# Patient Record
Sex: MALE | Race: WHITE | NOT HISPANIC OR LATINO | Employment: FULL TIME | URBAN - METROPOLITAN AREA
[De-identification: names, ages, dates, MRNs, and addresses within clinical notes are randomized per-mention and may not be internally consistent; named-entity substitution may affect disease eponyms.]

---

## 2024-01-22 ENCOUNTER — OFFICE VISIT (OUTPATIENT)
Dept: FAMILY MEDICINE CLINIC | Facility: CLINIC | Age: 34
End: 2024-01-22
Payer: COMMERCIAL

## 2024-01-22 VITALS
DIASTOLIC BLOOD PRESSURE: 84 MMHG | BODY MASS INDEX: 25.48 KG/M2 | SYSTOLIC BLOOD PRESSURE: 118 MMHG | RESPIRATION RATE: 16 BRPM | WEIGHT: 178 LBS | HEART RATE: 77 BPM | TEMPERATURE: 97.7 F | HEIGHT: 70 IN | OXYGEN SATURATION: 98 %

## 2024-01-22 DIAGNOSIS — Z76.89 ESTABLISHING CARE WITH NEW DOCTOR, ENCOUNTER FOR: Primary | ICD-10-CM

## 2024-01-22 DIAGNOSIS — Z90.49 S/P APPENDECTOMY: ICD-10-CM

## 2024-01-22 PROCEDURE — 99204 OFFICE O/P NEW MOD 45 MIN: CPT | Performed by: FAMILY MEDICINE

## 2024-01-22 RX ORDER — VITAMIN B COMPLEX
1 CAPSULE ORAL DAILY
COMMUNITY

## 2024-01-22 RX ORDER — ZINC GLUCONATE 50 MG
50 TABLET ORAL DAILY
COMMUNITY

## 2024-01-22 RX ORDER — UREA 10 %
500 LOTION (ML) TOPICAL 2 TIMES DAILY
COMMUNITY

## 2024-01-22 RX ORDER — MELATONIN
1000 DAILY
COMMUNITY

## 2024-01-22 RX ORDER — OMEGA-3-ACID ETHYL ESTERS 1 G/1
2 CAPSULE, LIQUID FILLED ORAL 2 TIMES DAILY
COMMUNITY

## 2024-01-22 NOTE — PROGRESS NOTES
Name: Jaime Person      : 1990      MRN: [id number]  Encounter Provider: Steve Ott MD  Encounter Date: 2024   Encounter department: [facility]    Assessment & Plan     1. Establishing care with new doctor, encounter for    2. S/P appendectomy           Subjective      PATIENT IS A NEW PATIENT TO THE PRACTICE    REVIEWED PAST MEDICAL HISTORY AND MEDICATIONS  NKA  FAMILY HX WAS UNREMARKABLE  - SMOKING  - ETOH 2-4 / WEEK    NO CONCERNS    DISCUSSED HEALTH AND PRACTICE ISSUES  RECOMMEND CPX / BW IN NEAR FUTURE      Review of Systems   Constitutional:  Negative for chills, fatigue and fever.   HENT:  Negative for congestion, ear discharge, ear pain, mouth sores, postnasal drip, sore throat and trouble swallowing.    Eyes:  Negative for pain, discharge and visual disturbance.   Respiratory:  Negative for cough, shortness of breath and wheezing.    Cardiovascular:  Negative for chest pain, palpitations and leg swelling.   Gastrointestinal:  Negative for abdominal distention, abdominal pain, blood in stool, diarrhea and nausea.   Endocrine: Negative for polydipsia, polyphagia and polyuria.   Genitourinary:  Negative for dysuria, frequency, hematuria and urgency.   Musculoskeletal:  Negative for arthralgias, gait problem and joint swelling.   Skin:  Negative for pallor and rash.   Neurological:  Negative for dizziness, syncope, speech difficulty, weakness, light-headedness, numbness and headaches.   Hematological:  Negative for adenopathy.   Psychiatric/Behavioral:  Negative for behavioral problems, confusion and sleep disturbance. The patient is not nervous/anxious.        Current Outpatient Medications on File Prior to Visit   Medication Sig   • b complex vitamins capsule Take 1 capsule by mouth daily   • cholecalciferol (VITAMIN D3) 1,000 units tablet Take 1,000 Units by mouth daily   • magnesium gluconate (MAGONATE) 500 mg tablet Take 500 mg by mouth 2 (two) times a day  "  • omega-3-acid ethyl esters (LOVAZA) 1 g capsule Take 2 g by mouth 2 (two) times a day   • Quercetin 50 MG TABS Take by mouth   • zinc gluconate 50 mg tablet Take 50 mg by mouth daily       Objective     /84 (BP Location: Left arm, Patient Position: Sitting, Cuff Size: Standard)   Pulse 77   Temp 97.7 °F (36.5 °C) (Temporal)   Resp 16   Ht 5' 9.5\" (1.765 m)   Wt 80.7 kg (178 lb)   SpO2 98%   BMI 25.91 kg/m²     Physical Exam  Vitals reviewed.   Constitutional:       General: He is not in acute distress.     Appearance: Normal appearance. He is well-developed and normal weight. He is not ill-appearing.   HENT:      Head: Normocephalic and atraumatic.   Eyes:      General:         Right eye: No discharge.         Left eye: No discharge.      Conjunctiva/sclera: Conjunctivae normal.      Pupils: Pupils are equal, round, and reactive to light.   Neck:      Thyroid: No thyromegaly.      Vascular: No JVD.   Cardiovascular:      Rate and Rhythm: Normal rate and regular rhythm.      Heart sounds: Normal heart sounds. No murmur heard.  Pulmonary:      Effort: Pulmonary effort is normal.      Breath sounds: Normal breath sounds. No wheezing or rales.   Abdominal:      General: Bowel sounds are normal.      Palpations: Abdomen is soft. There is no mass.      Tenderness: There is no abdominal tenderness. There is no guarding or rebound.   Musculoskeletal:         General: No tenderness or deformity. Normal range of motion.      Cervical back: Neck supple.   Lymphadenopathy:      Cervical: No cervical adenopathy.   Skin:     General: Skin is warm and dry.      Findings: No erythema or rash.   Neurological:      General: No focal deficit present.      Mental Status: He is alert and oriented to person, place, and time.   Psychiatric:         Mood and Affect: Mood normal.         Behavior: Behavior normal.         Thought Content: Thought content normal.         Judgment: Judgment normal.       Steve Ott, " MD

## 2024-02-21 ENCOUNTER — TELEPHONE (OUTPATIENT)
Age: 34
End: 2024-02-21

## 2024-02-21 DIAGNOSIS — M70.70 BURSITIS OF HIP, UNSPECIFIED BURSA, UNSPECIFIED LATERALITY: Primary | ICD-10-CM

## 2024-02-21 NOTE — TELEPHONE ENCOUNTER
Jaime is asking for an order to [facility] for L hip/ pelvic bursitis.  Please place order is his MyChart.    (Insurance on file does NOT need a insurance referral)

## 2024-02-21 NOTE — TELEPHONE ENCOUNTER
The patient called he would like a referral for an orthopedic doctor for his bursitis  thank you

## 2024-03-01 ENCOUNTER — APPOINTMENT (OUTPATIENT)
Dept: RADIOLOGY | Facility: CLINIC | Age: 34
End: 2024-03-01
Payer: COMMERCIAL

## 2024-03-01 VITALS
WEIGHT: 178 LBS | SYSTOLIC BLOOD PRESSURE: 128 MMHG | BODY MASS INDEX: 25.48 KG/M2 | HEART RATE: 76 BPM | HEIGHT: 70 IN | DIASTOLIC BLOOD PRESSURE: 83 MMHG

## 2024-03-01 DIAGNOSIS — G57.02 PIRIFORMIS SYNDROME OF LEFT SIDE: ICD-10-CM

## 2024-03-01 DIAGNOSIS — M25.552 PAIN IN LEFT HIP: ICD-10-CM

## 2024-03-01 DIAGNOSIS — M76.892 HAMSTRING TENDINITIS OF LEFT THIGH: Primary | ICD-10-CM

## 2024-03-01 PROCEDURE — 99204 OFFICE O/P NEW MOD 45 MIN: CPT | Performed by: ORTHOPAEDIC SURGERY

## 2024-03-01 PROCEDURE — 73502 X-RAY EXAM HIP UNI 2-3 VIEWS: CPT

## 2024-03-01 NOTE — PROGRESS NOTES
Assessment/Plan:  1. Hamstring tendinitis of left thigh  Ambulatory Referral to Orthopedic Surgery    Ambulatory referral to Physical Therapy      2. Piriformis syndrome of left side  XR hip/pelv 2-3 vws left if performed    Ambulatory referral to Physical Therapy          Jaime has left-sided buttock pain which appears to be at the insertion of his hamstring tendon.  This is consistent with hamstring tendinopathy.  He is not does not sound like he had an abrupt avulsion injury or evidence of tear however he may be experiencing some tendinosis of the hamstring.  He also sounds like he may be experiencing some occasional pain in the piriformis and buttock region that occurs after physical activity as well.  I recommended formal physical therapy at this time.  His x-rays today are unremarkable.  If his pain persist or worsens we could consider further imaging with MRI if clinically indicated.  He verbalized understand this plan.  Follow-up after therapy is complete.    Subjective:   Jaime Person is a 34 y.o. male who presents to the office for evaluation for left-sided buttock and leg pain.  He denies any specific injury or trauma.  He has been feeling increased discomfort in the left leg with physical activity such as playing soccer or running.  He typically plays soccer but has not been able to for the last 3 weeks.  He has a history of pain in the left buttock and went to another orthopedic office months ago and was told he had bursitis in the hip and was instructed to take anti-inflammatory medications.  This has not significantly helped.  He has not had any other formal evaluation or therapy.  He feels occasional pain into the left buttock area and increases after for a lot of physical activity.  Recently he tried lifting a fallen tree as a yard and this increased discomfort in the area of the buttock significantly.  He states is calm down since then but he still gets this occasional discomfort.      Review of  Systems   Constitutional:  Negative for chills, fever and unexpected weight change.   HENT:  Negative for hearing loss, nosebleeds and sore throat.    Eyes:  Negative for pain, redness and visual disturbance.   Respiratory:  Negative for cough, shortness of breath and wheezing.    Cardiovascular:  Negative for chest pain, palpitations and leg swelling.   Gastrointestinal:  Negative for abdominal pain, nausea and vomiting.   Endocrine: Negative for polyphagia and polyuria.   Genitourinary:  Negative for dysuria and hematuria.   Musculoskeletal:         See HPI   Skin:  Negative for rash and wound.   Neurological:  Negative for dizziness, numbness and headaches.   Psychiatric/Behavioral:  Negative for decreased concentration and suicidal ideas. The patient is not nervous/anxious.          History reviewed. No pertinent past medical history.    History reviewed. No pertinent surgical history.    History reviewed. No pertinent family history.    Social History     Occupational History    Not on file   Tobacco Use    Smoking status: Never    Smokeless tobacco: Never   Vaping Use    Vaping status: Never Used   Substance and Sexual Activity    Alcohol use: Yes     Alcohol/week: 2.0 - 4.0 standard drinks of alcohol     Types: 2 - 4 Standard drinks or equivalent per week     Comment: No drinking sunday to thursday    Drug use: Not Currently     Frequency: 1.0 times per week     Types: Marijuana     Comment: occasionally    Sexual activity: Yes     Partners: Female     Birth control/protection: None     Comment: . Wife is pregnant         Current Outpatient Medications:     b complex vitamins capsule, Take 1 capsule by mouth daily, Disp: , Rfl:     cholecalciferol (VITAMIN D3) 1,000 units tablet, Take 1,000 Units by mouth daily, Disp: , Rfl:     magnesium gluconate (MAGONATE) 500 mg tablet, Take 500 mg by mouth 2 (two) times a day, Disp: , Rfl:     omega-3-acid ethyl esters (LOVAZA) 1 g capsule, Take 2 g by mouth 2  (two) times a day, Disp: , Rfl:     Quercetin 50 MG TABS, Take by mouth, Disp: , Rfl:     zinc gluconate 50 mg tablet, Take 50 mg by mouth daily, Disp: , Rfl:     No Known Allergies    Objective:  Vitals:    03/01/24 1315   BP: 128/83   Pulse: 76            Left Hip Exam     Tenderness   The patient is experiencing tenderness in the ischial tuberosity.    Range of Motion   Abduction:  normal   Adduction:  normal   Extension:  normal   Flexion:  normal   External rotation:  normal   Internal rotation: normal     Muscle Strength   Abduction: 5/5   Adduction: 5/5   Flexion: 5/5     Tests   JERRY: negative    Other   Erythema: absent  Sensation: normal  Pulse: present            Physical Exam  Vitals reviewed.   Constitutional:       Appearance: He is well-developed.   HENT:      Head: Normocephalic and atraumatic.   Eyes:      Conjunctiva/sclera: Conjunctivae normal.      Pupils: Pupils are equal, round, and reactive to light.   Cardiovascular:      Rate and Rhythm: Normal rate.      Pulses: Normal pulses.   Pulmonary:      Effort: Pulmonary effort is normal. No respiratory distress.   Musculoskeletal:      Cervical back: Normal range of motion and neck supple.      Comments: As noted in HPI   Skin:     General: Skin is warm and dry.   Neurological:      General: No focal deficit present.      Mental Status: He is alert and oriented to person, place, and time.   Psychiatric:         Mood and Affect: Mood normal.         Behavior: Behavior normal.         I have personally reviewed pertinent films in PACS and my interpretation is as follows:  X-ray left hip demonstrates no evidence of acute fracture or significant degenerative      This document was created using speech voice recognition software.   Grammatical errors, random word insertions, pronoun errors, and incomplete sentences are an occasional consequence of this system due to software limitations, ambient noise, and hardware issues.   Any formal questions or  concerns about content, text, or information contained within the body of this dictation should be directly addressed to the provider for clarification.

## 2024-04-01 DIAGNOSIS — Z11.4 SCREENING FOR HIV (HUMAN IMMUNODEFICIENCY VIRUS): ICD-10-CM

## 2024-04-01 DIAGNOSIS — Z13.220 SCREENING FOR LIPID DISORDERS: ICD-10-CM

## 2024-04-01 DIAGNOSIS — Z00.00 ROUTINE GENERAL MEDICAL EXAMINATION AT A HEALTH CARE FACILITY: ICD-10-CM

## 2024-04-01 DIAGNOSIS — Z11.59 NEED FOR HEPATITIS C SCREENING TEST: ICD-10-CM

## 2024-04-01 DIAGNOSIS — Z13.29 SCREENING FOR THYROID DISORDER: ICD-10-CM

## 2024-04-01 DIAGNOSIS — Z13.6 SCREENING FOR HYPERTENSION: Primary | ICD-10-CM

## 2024-05-28 LAB
BASOPHILS # BLD AUTO: 0 X10E3/UL (ref 0–0.2)
BASOPHILS NFR BLD AUTO: 1 %
EOSINOPHIL # BLD AUTO: 0.2 X10E3/UL (ref 0–0.4)
EOSINOPHIL NFR BLD AUTO: 2 %
ERYTHROCYTE [DISTWIDTH] IN BLOOD BY AUTOMATED COUNT: 12.5 % (ref 11.6–15.4)
HCT VFR BLD AUTO: 42.8 % (ref 37.5–51)
HGB BLD-MCNC: 14.7 G/DL (ref 13–17.7)
IMM GRANULOCYTES # BLD: 0 X10E3/UL (ref 0–0.1)
IMM GRANULOCYTES NFR BLD: 0 %
LYMPHOCYTES # BLD AUTO: 1.5 X10E3/UL (ref 0.7–3.1)
LYMPHOCYTES NFR BLD AUTO: 22 %
MCH RBC QN AUTO: 29.7 PG (ref 26.6–33)
MCHC RBC AUTO-ENTMCNC: 34.3 G/DL (ref 31.5–35.7)
MCV RBC AUTO: 87 FL (ref 79–97)
MONOCYTES # BLD AUTO: 0.5 X10E3/UL (ref 0.1–0.9)
MONOCYTES NFR BLD AUTO: 8 %
NEUTROPHILS # BLD AUTO: 4.6 X10E3/UL (ref 1.4–7)
NEUTROPHILS NFR BLD AUTO: 67 %
PLATELET # BLD AUTO: 267 X10E3/UL (ref 150–450)
RBC # BLD AUTO: 4.95 X10E6/UL (ref 4.14–5.8)
WBC # BLD AUTO: 6.9 X10E3/UL (ref 3.4–10.8)

## 2024-05-29 LAB
ALBUMIN SERPL-MCNC: 5.1 G/DL (ref 4.1–5.1)
ALBUMIN/GLOB SERPL: 2.3 {RATIO} (ref 1.2–2.2)
ALP SERPL-CCNC: 66 IU/L (ref 44–121)
ALT SERPL-CCNC: 16 IU/L (ref 0–44)
AST SERPL-CCNC: 26 IU/L (ref 0–40)
BILIRUB SERPL-MCNC: 0.8 MG/DL (ref 0–1.2)
BUN SERPL-MCNC: 15 MG/DL (ref 6–20)
BUN/CREAT SERPL: 19 (ref 9–20)
CALCIUM SERPL-MCNC: 10 MG/DL (ref 8.7–10.2)
CHLORIDE SERPL-SCNC: 100 MMOL/L (ref 96–106)
CHOLEST SERPL-MCNC: 243 MG/DL (ref 100–199)
CHOLEST/HDLC SERPL: 3.6 RATIO (ref 0–5)
CO2 SERPL-SCNC: 26 MMOL/L (ref 20–29)
CREAT SERPL-MCNC: 0.79 MG/DL (ref 0.76–1.27)
EGFR: 120 ML/MIN/1.73
GLOBULIN SER-MCNC: 2.2 G/DL (ref 1.5–4.5)
GLUCOSE SERPL-MCNC: 78 MG/DL (ref 70–99)
HCV AB S/CO SERPL IA: NON REACTIVE
HDLC SERPL-MCNC: 68 MG/DL
HIV 1+2 AB+HIV1 P24 AG SERPL QL IA: NON REACTIVE
LDLC SERPL CALC-MCNC: 165 MG/DL (ref 0–99)
POTASSIUM SERPL-SCNC: 4.3 MMOL/L (ref 3.5–5.2)
PROT SERPL-MCNC: 7.3 G/DL (ref 6–8.5)
SL AMB VLDL CHOLESTEROL CALC: 10 MG/DL (ref 5–40)
SODIUM SERPL-SCNC: 140 MMOL/L (ref 134–144)
TRIGL SERPL-MCNC: 63 MG/DL (ref 0–149)
TSH SERPL DL<=0.005 MIU/L-ACNC: 1.33 UIU/ML (ref 0.45–4.5)

## 2024-06-02 NOTE — PATIENT INSTRUCTIONS
DISCUSSED HEALTH MAINTENENCE ISSUES  BW WILL BE REVIEWED  ENCOURAGED HEALTHY DIET AND EXERCISE  COLONOSCOPY WILL BE ORDERED  RV FOR ANNUAL HEALTH EXAM IN 1 YEAR  RV SOONER IF THERE ARE ANY CONCERNS      Recent Results (from the past 672 hour(s))   CBC and differential    Collection Time: 05/28/24 12:55 PM   Result Value Ref Range    White Blood Cell Count 6.9 3.4 - 10.8 x10E3/uL    Red Blood Cell Count 4.95 4.14 - 5.80 x10E6/uL    Hemoglobin 14.7 13.0 - 17.7 g/dL    HCT 42.8 37.5 - 51.0 %    MCV 87 79 - 97 fL    MCH 29.7 26.6 - 33.0 pg    MCHC 34.3 31.5 - 35.7 g/dL    RDW 12.5 11.6 - 15.4 %    Platelet Count 267 150 - 450 x10E3/uL    Neutrophils 67 Not Estab. %    Lymphocytes 22 Not Estab. %    Monocytes 8 Not Estab. %    Eosinophils 2 Not Estab. %    Basophils PCT 1 Not Estab. %    Neutrophils (Absolute) 4.6 1.4 - 7.0 x10E3/uL    Lymphocytes (Absolute) 1.5 0.7 - 3.1 x10E3/uL    Monocytes (Absolute) 0.5 0.1 - 0.9 x10E3/uL    Eosinophils (Absolute) 0.2 0.0 - 0.4 x10E3/uL    Basophils ABS 0.0 0.0 - 0.2 x10E3/uL    Immature Granulocytes 0 Not Estab. %    Immature Granulocytes (Absolute) 0.0 0.0 - 0.1 x10E3/uL   Comprehensive metabolic panel    Collection Time: 05/28/24 12:55 PM   Result Value Ref Range    Glucose, Random 78 70 - 99 mg/dL    BUN 15 6 - 20 mg/dL    Creatinine 0.79 0.76 - 1.27 mg/dL    eGFR 120 >59 mL/min/1.73    SL AMB BUN/CREATININE RATIO 19 9 - 20    Sodium 140 134 - 144 mmol/L    Potassium 4.3 3.5 - 5.2 mmol/L    Chloride 100 96 - 106 mmol/L    CO2 26 20 - 29 mmol/L    CALCIUM 10.0 8.7 - 10.2 mg/dL    Protein, Total 7.3 6.0 - 8.5 g/dL    Albumin 5.1 4.1 - 5.1 g/dL    Globulin, Total 2.2 1.5 - 4.5 g/dL    Albumin/Globulin Ratio 2.3 (H) 1.2 - 2.2    TOTAL BILIRUBIN 0.8 0.0 - 1.2 mg/dL    Alk Phos Isoenzymes 66 44 - 121 IU/L    AST 26 0 - 40 IU/L    ALT 16 0 - 44 IU/L   Human Immunodeficiency Virus 1/2 Antigen / Antibody ( Fourth Generation) with Reflex Testing    Collection Time: 05/28/24 12:55 PM    Result Value Ref Range    HIV Screen 4th Generation wRflx Non Reactive Non Reactive   Lipid panel    Collection Time: 05/28/24 12:55 PM   Result Value Ref Range    Cholesterol, Total 243 (H) 100 - 199 mg/dL    Triglycerides 63 0 - 149 mg/dL    HDL 68 >39 mg/dL    VLDL Cholesterol Calculated 10 5 - 40 mg/dL    LDL Calculated 165 (H) 0 - 99 mg/dL    T. Chol/HDL Ratio 3.6 0.0 - 5.0 ratio   TSH, 3rd generation    Collection Time: 05/28/24 12:55 PM   Result Value Ref Range    TSH 1.330 0.450 - 4.500 uIU/mL   Hepatitis C antibody    Collection Time: 05/28/24 12:55 PM   Result Value Ref Range    HEP C AB Non Reactive Non Reactive

## 2024-06-03 ENCOUNTER — OFFICE VISIT (OUTPATIENT)
Dept: FAMILY MEDICINE CLINIC | Facility: CLINIC | Age: 34
End: 2024-06-03
Payer: COMMERCIAL

## 2024-06-03 VITALS
HEART RATE: 66 BPM | BODY MASS INDEX: 26.51 KG/M2 | HEIGHT: 70 IN | RESPIRATION RATE: 16 BRPM | WEIGHT: 185.2 LBS | SYSTOLIC BLOOD PRESSURE: 134 MMHG | OXYGEN SATURATION: 97 % | DIASTOLIC BLOOD PRESSURE: 80 MMHG | TEMPERATURE: 98 F

## 2024-06-03 DIAGNOSIS — Z13.6 SCREENING FOR HYPERTENSION: ICD-10-CM

## 2024-06-03 DIAGNOSIS — Z13.220 SCREENING FOR HYPERLIPIDEMIA: ICD-10-CM

## 2024-06-03 DIAGNOSIS — Z13.29 SCREENING FOR HYPOTHYROIDISM: ICD-10-CM

## 2024-06-03 DIAGNOSIS — Z00.00 ROUTINE GENERAL MEDICAL EXAMINATION AT A HEALTH CARE FACILITY: Primary | ICD-10-CM

## 2024-06-03 PROCEDURE — 93000 ELECTROCARDIOGRAM COMPLETE: CPT | Performed by: FAMILY MEDICINE

## 2024-06-03 PROCEDURE — 99395 PREV VISIT EST AGE 18-39: CPT | Performed by: FAMILY MEDICINE

## 2024-06-03 RX ORDER — FLUTICASONE PROPIONATE 50 MCG
1 SPRAY, SUSPENSION (ML) NASAL DAILY PRN
COMMUNITY

## 2024-06-03 NOTE — PROGRESS NOTES
FAMILY PRACTICE HEALTH MAINTENANCE OFFICE VISIT  [facility] - [facility]    NAME: Jaime Person  AGE: 34 y.o. SEX: male  : 1990     DATE: 6/3/2024    Assessment and Plan     Problem List Items Addressed This Visit    None  Visit Diagnoses     Routine general medical examination at a health care facility    -  Primary    Screening for hypertension        Relevant Orders    POCT ECG    Screening for hyperlipidemia        Screening for hypothyroidism                   Return in about 1 year (around 6/3/2025) for Annual physical.        Chief Complaint     Chief Complaint   Patient presents with   • Physical Exam     Shayan/VALERIE         History of Present Illness     HPI    Well Adult Physical   Patient here for a comprehensive physical exam.      Diet and Physical Activity  Diet: well balanced diet  Weight concerns: Patient is overweight (BMI 25.0-29.9)  Exercise: intermittently      Depression Screen  PHQ-2/9 Depression Screening    Little interest or pleasure in doing things: 0 - not at all  Feeling down, depressed, or hopeless: 0 - not at all          General Health  Hearing: Normal:  bilateral  Vision: no vision problems  Dental: regular dental visits    Reproductive Health          The following portions of the patient's history were reviewed and updated as appropriate: allergies, current medications, past family history, past medical history, past social history, past surgical history and problem list.    Review of Systems     Review of Systems    Past Medical History     History reviewed. No pertinent past medical history.    Past Surgical History     History reviewed. No pertinent surgical history.    Social History     Social History     Socioeconomic History   • Marital status: /Civil Union     Spouse name: None   • Number of children: None   • Years of education: None   • Highest education level: None   Occupational History   • None   Tobacco Use   • Smoking  status: Never   • Smokeless tobacco: Never   Vaping Use   • Vaping status: Never Used   Substance and Sexual Activity   • Alcohol use: Yes     Alcohol/week: 2.0 - 4.0 standard drinks of alcohol     Types: 2 - 4 Standard drinks or equivalent per week     Comment: No drinking sunday to thursday   • Drug use: Not Currently     Frequency: 1.0 times per week     Types: Marijuana     Comment: occasionally   • Sexual activity: Yes     Partners: Female     Birth control/protection: None     Comment: . Wife is pregnant   Other Topics Concern   • None   Social History Narrative   • None     Social Determinants of Health     Financial Resource Strain: Not on file   Food Insecurity: Not on file   Transportation Needs: Not on file   Physical Activity: Not on file   Stress: Not on file   Social Connections: Not on file   Intimate Partner Violence: Not on file   Housing Stability: Not on file       Family History     History reviewed. No pertinent family history.    Current Medications       Current Outpatient Medications:   •  b complex vitamins capsule, Take 1 capsule by mouth daily, Disp: , Rfl:   •  cholecalciferol (VITAMIN D3) 1,000 units tablet, Take 1,000 Units by mouth daily, Disp: , Rfl:   •  fluticasone (FLONASE) 50 mcg/act nasal spray, 1 spray into each nostril daily as needed, Disp: , Rfl:   •  magnesium gluconate (MAGONATE) 500 mg tablet, Take 500 mg by mouth 2 (two) times a day, Disp: , Rfl:   •  omega-3-acid ethyl esters (LOVAZA) 1 g capsule, Take 2 g by mouth 2 (two) times a day, Disp: , Rfl:   •  Quercetin 50 MG TABS, Take by mouth, Disp: , Rfl:   •  zinc gluconate 50 mg tablet, Take 50 mg by mouth daily, Disp: , Rfl:      Allergies     Allergies   Allergen Reactions   • Pollen Extract Nasal Congestion and Sneezing       Objective     There were no vitals taken for this visit.     Physical Exam      No results found.    Health Maintenance     Health Maintenance   Topic Date Due   • DTaP,Tdap,and Td Vaccines  (1 - Tdap) Never done   • COVID-19 Vaccine (1 - 2023-24 season) Never done   • Influenza Vaccine (Season Ended) 09/01/2024   • Depression Screening  01/22/2025   • Annual Physical  06/03/2025   • Zoster Vaccine (1 of 2) 02/27/2041   • RSV Vaccine Age 60+ Years (1 - 1-dose 60+ series) 02/27/2051   • HIV Screening  Completed   • Hepatitis C Screening  Completed   • RSV Vaccine age 0-20 Months  Aged Out   • Pneumococcal Vaccine: Pediatrics (0 to 5 Years) and At-Risk Patients (6 to 64 Years)  Aged Out   • HIB Vaccine  Aged Out   • IPV Vaccine  Aged Out   • Hepatitis A Vaccine  Aged Out   • Meningococcal ACWY Vaccine  Aged Out   • HPV Vaccine  Aged Out       There is no immunization history on file for this patient.    Steve Ott MD  [facility]

## 2024-06-03 NOTE — LETTER
ABDIAZIZ COHEN      Current Outpatient Medications:     b complex vitamins capsule, Take 1 capsule by mouth daily, Disp: , Rfl:     cholecalciferol (VITAMIN D3) 1,000 units tablet, Take 1,000 Units by mouth daily, Disp: , Rfl:     magnesium gluconate (MAGONATE) 500 mg tablet, Take 500 mg by mouth 2 (two) times a day, Disp: , Rfl:     omega-3-acid ethyl esters (LOVAZA) 1 g capsule, Take 2 g by mouth 2 (two) times a day, Disp: , Rfl:     Quercetin 50 MG TABS, Take by mouth, Disp: , Rfl:     zinc gluconate 50 mg tablet, Take 50 mg by mouth daily, Disp: , Rfl:     Recent Results (from the past 672 hour(s))   CBC and differential    Collection Time: 05/28/24 12:55 PM   Result Value Ref Range    White Blood Cell Count 6.9 3.4 - 10.8 x10E3/uL    Red Blood Cell Count 4.95 4.14 - 5.80 x10E6/uL    Hemoglobin 14.7 13.0 - 17.7 g/dL    HCT 42.8 37.5 - 51.0 %    MCV 87 79 - 97 fL    MCH 29.7 26.6 - 33.0 pg    MCHC 34.3 31.5 - 35.7 g/dL    RDW 12.5 11.6 - 15.4 %    Platelet Count 267 150 - 450 x10E3/uL    Neutrophils 67 Not Estab. %    Lymphocytes 22 Not Estab. %    Monocytes 8 Not Estab. %    Eosinophils 2 Not Estab. %    Basophils PCT 1 Not Estab. %    Neutrophils (Absolute) 4.6 1.4 - 7.0 x10E3/uL    Lymphocytes (Absolute) 1.5 0.7 - 3.1 x10E3/uL    Monocytes (Absolute) 0.5 0.1 - 0.9 x10E3/uL    Eosinophils (Absolute) 0.2 0.0 - 0.4 x10E3/uL    Basophils ABS 0.0 0.0 - 0.2 x10E3/uL    Immature Granulocytes 0 Not Estab. %    Immature Granulocytes (Absolute) 0.0 0.0 - 0.1 x10E3/uL   Comprehensive metabolic panel    Collection Time: 05/28/24 12:55 PM   Result Value Ref Range    Glucose, Random 78 70 - 99 mg/dL    BUN 15 6 - 20 mg/dL    Creatinine 0.79 0.76 - 1.27 mg/dL    eGFR 120 >59 mL/min/1.73    SL AMB BUN/CREATININE RATIO 19 9 - 20    Sodium 140 134 - 144 mmol/L    Potassium 4.3 3.5 - 5.2 mmol/L    Chloride 100 96 - 106 mmol/L    CO2 26 20 - 29 mmol/L    CALCIUM 10.0 8.7 - 10.2 mg/dL    Protein, Total 7.3 6.0 - 8.5 g/dL    Albumin  5.1 4.1 - 5.1 g/dL    Globulin, Total 2.2 1.5 - 4.5 g/dL    Albumin/Globulin Ratio 2.3 (H) 1.2 - 2.2    TOTAL BILIRUBIN 0.8 0.0 - 1.2 mg/dL    Alk Phos Isoenzymes 66 44 - 121 IU/L    AST 26 0 - 40 IU/L    ALT 16 0 - 44 IU/L   Human Immunodeficiency Virus 1/2 Antigen / Antibody ( Fourth Generation) with Reflex Testing    Collection Time: 05/28/24 12:55 PM   Result Value Ref Range    HIV Screen 4th Generation wRflx Non Reactive Non Reactive   Lipid panel    Collection Time: 05/28/24 12:55 PM   Result Value Ref Range    Cholesterol, Total 243 (H) 100 - 199 mg/dL    Triglycerides 63 0 - 149 mg/dL    HDL 68 >39 mg/dL    VLDL Cholesterol Calculated 10 5 - 40 mg/dL    LDL Calculated 165 (H) 0 - 99 mg/dL    T. Chol/HDL Ratio 3.6 0.0 - 5.0 ratio   TSH, 3rd generation    Collection Time: 05/28/24 12:55 PM   Result Value Ref Range    TSH 1.330 0.450 - 4.500 uIU/mL   Hepatitis C antibody    Collection Time: 05/28/24 12:55 PM   Result Value Ref Range    HEP C AB Non Reactive Non Reactive

## 2024-06-07 ENCOUNTER — TELEPHONE (OUTPATIENT)
Age: 34
End: 2024-06-07

## 2024-06-07 NOTE — TELEPHONE ENCOUNTER
The patient called to inform that he does not need the TB vaccine because he had it in 2020 and his previous doctor will send the immunization records.

## 2024-06-17 ENCOUNTER — EVALUATION (OUTPATIENT)
Dept: PHYSICAL THERAPY | Facility: CLINIC | Age: 34
End: 2024-06-17
Payer: COMMERCIAL

## 2024-06-17 DIAGNOSIS — G57.02 PIRIFORMIS SYNDROME OF LEFT SIDE: ICD-10-CM

## 2024-06-17 DIAGNOSIS — M76.892 HAMSTRING TENDINITIS OF LEFT THIGH: ICD-10-CM

## 2024-06-17 PROCEDURE — 97162 PT EVAL MOD COMPLEX 30 MIN: CPT

## 2024-06-17 NOTE — PROGRESS NOTES
PT Evaluation     Today's date: 2024  Patient name: Jaime Person  : 1990  MRN: [id number]  Referring provider: Ricardo Soto DO  Dx:   Encounter Diagnosis     ICD-10-CM    1. Hamstring tendinitis of left thigh  M76.892 Ambulatory referral to Physical Therapy      2. Piriformis syndrome of left side  G57.02 Ambulatory referral to Physical Therapy          Start Time: 1648  Stop Time: 1738  Total time in clinic (min): 50 minutes    Assessment  Impairments: abnormal or restricted ROM, activity intolerance, impaired physical strength, lacks appropriate home exercise program and pain with function    Assessment details: Jaime Person is a 34 y.o. male presenting with left posterior hip/gluteal pain that began about 3 months ago while playing soccer. Pt was gradually improving independently however recently experienced a significant increase in pain after lifting heavy objects around his home. Pt currently presents with tenderness to palpation over left piriformis/deep gluteal muscles and proximal hamstring tendon, mildly impaired left hip ROM, decreased left LE strength, pain with functional movements that involve deep hip flexion, tightness of gluteals/hip flexors/hamstrings, increased anterior pelvic tilt, mild atrophy of left gastroc/soleus, and mildly painful resisted hamstring isometric. Pt functional impairments currently include transitioning out of bed, walking, soccer/sport activity, running, lifting, and sleeping due to pain. Pt clinical presentation is consistent with referring diagnosis of left hamstring tendinitis and left piriformis syndrome likely secondary to muscular imbalances and repetitive loading. Jaime would benefit from skilled physical therapy services to address aforementioned impairments, reduce risk of injury recurrence with gradual tendon loading, and promote return to sport and prior level of function without limitation.   Understanding of Dx/Px/POC: good     Prognosis:  good    Goals  STG 2-4 weeks  1. Patient will be independent with HEP  2. Patient will improve left hip extension strength by 1/2 MMT grade and pain <1/10   3. Patient will improve left knee flexion strength by 1/2 MMT grade and pain <1/10   4. Patient will demonstrate improved left hip ROM to WNL    LTG 6-8 weeks   1. Patient will be able to return to soccer without limitation   2. Patient will be independent with dynamic warmup and cool down routine to reduce risk of injury with return to sport   3. Patient will be able to lift at least 25lbs with pain <2/10   4. Patient will be able to transfer out of bed with pain <1/10   5. Patient will be able to sleep 6-8 hours without waking due to pain    Plan  Patient would benefit from: skilled physical therapy and PT eval  Planned modality interventions: cryotherapy, electrical stimulation/Russian stimulation and thermotherapy: hydrocollator packs    Planned therapy interventions: joint mobilization, kinesiology taping, manual therapy, neuromuscular re-education, patient/caregiver education, stretching, strengthening, therapeutic activities, therapeutic exercise, functional ROM exercises, home exercise program and postural training    Frequency: 2x week  Duration in weeks: 8  Plan of Care beginning date: 6/17/2024  Plan of Care expiration date: 8/12/2024  Treatment plan discussed with: patient  Plan details: HEP development, stretching, strengthening, A/AA/PROM, joint mobilizations, posture education, STM/MI as needed to reduce muscle tension, muscle reeducation, PLOC discussed and agreed upon with patient.          Subjective Evaluation    History of Present Illness  Mechanism of injury: Jaime reports experiencing pain in back of left hip that began about 3 months ago while playing soccer. Pt reports pain intermittently became sharp and radiated down his left leg to knee, but is more typically a dull ache in glute. Pt states his pain was subsiding with stretching and  rest until about 2 weeks ago after lifting heavy objects around his home to prepare for  baby. Pt reports it has been difficult to sleep, lift his leg to get out of bed, walk, and play soccer due to pain and stiffness. Pt states he typically will take ibuprofen prior to soccer which seems to help manage his pain, and has integrated an active warmup since his initial injury. Additionally, pt reports history of left ankle fracture about 20 years ago that he did not have formal physical therapy however he notices his left lower leg has less muscle mass.  Patient Goals  Patient goals for therapy: decreased pain, return to sport/leisure activities and increased motion  Patient goal: return to soccer without pain  Pain  Current pain ratin  At best pain ratin  At worst pain ratin  Quality: dull ache and sharp  Relieving factors: rest and medications  Aggravating factors: running and lifting    Social Support  Steps to enter house: yes  Stairs in house: no   Lives in: one-story house  Lives with: spouse    Employment status: working ()        Objective     Static Posture     Pelvis   Anterior pelvic tilt    Observations   Left Ankle/Foot   Positive for atrophy.     Additional Observation Details  Left gastroc/soleus reduced in size compared to right; hx of left ankle fracture without formal rehabilitation     Palpation   Left   No palpable tenderness to the lumbar paraspinals and quadratus lumborum.   Hypertonic in the piriformis.   Tenderness of the gluteus albert, gluteus medius, piriformis, proximal biceps femoris, proximal semimembranosus and proximal semitendinosus.   Trigger point to piriformis.     Right   No palpable tenderness to the gluteus albert, gluteus medius, lumbar paraspinals, piriformis, proximal biceps femoris, proximal semimembranosus, proximal semitendinosus and quadratus lumborum.     Tenderness     Left Hip   Tenderness in the ischial tuberosity. No tenderness in  the PSIS and sacroiliac joint.     Right Hip   No tenderness in the PSIS, ischial tuberosity and sacroiliac joint.     Additional Tenderness Details  Left proximal hamstring tendon     Lumbar Screen  Lumbar range of motion within normal limits.    Tests     Left Hip   Positive Ely's and JRERY.   Negative FADIR.   SLR: Positive.     Right Hip   Positive Ely's.   Negative JERRY and FADIR.   SLR: Negative.     Ambulation     Ambulation: Stairs   Ascend stairs: independent  Pattern: reciprocal  Railings: without rails  Descend stairs: independent  Pattern: reciprocal  Railings: without rails    Additional Stairs Ambulation Details  No pain with stairs     Observational Gait   Gait: within functional limits   Left stance time, right stance time, left swing time, right swing time and left step length within functional limits.     Quality of Movement During Gait     Pelvis  Anterior pelvic tilt.     Hip  Left hip within functional limits and right hip within functional limits.     Functional Assessment      Squat    Pain.     Single Leg Squat   Left Leg  Valgus.     Right Leg  Within functional limits.     Comments  Squat: pain on left when performed at full depth; reduced pain with decreased depth         MMT IE IE      Left Right Left Right   Hip Flex 4+ 4+     Hip Abd 4+ 5     Hip IR 4+ 4+     Hip ER 4 4+     Hip Ext 4+ * 5     Knee Ext 5 5     Knee Flex 4+ * 5       ROM IE IE      Left Right Left Right   Hip Flex 112A/117P 115A/120P     Hip IR 12P supine @ 90 15P supine @90     Hip ER 32P supine @90 40P supine @90            (*) denotes discomfort with testing            Precautions: none        Insurance:  AMA/CMS Eval/ Re-eval Auth #/ Referral # Total units or visits Start date  Expiration date KX? Visit limitation?  PT only or  PT+OT? Co-Insurance   AMA 6/17/24 Not required     BOMN  20% after ded                  POC Start Date POC Expiration Date Signed POC?   6/17/2024 8/12/2024 pending      Date                Visits/Units:  Used               Authed: not required  Remaining                      Manuals    6/17                               Neuro Re-Ed       Bridges    Up with 2, down with L 5sec lower 10x   Posterior pelvic tilts    In standing 5sec hold 5x                                      Ther Ex       HEP    Initiated    Education    Pelvic positioning at work, loading principles    Hip extension    Prone 10x 5sec lower   Piriformis stretch    1 x 30s   Cross over glute stretch    1 x 30s                                      Ther Activity                     Gait Training                     Modalities

## 2024-06-20 ENCOUNTER — OFFICE VISIT (OUTPATIENT)
Dept: PHYSICAL THERAPY | Facility: CLINIC | Age: 34
End: 2024-06-20
Payer: COMMERCIAL

## 2024-06-20 DIAGNOSIS — M76.892 HAMSTRING TENDINITIS OF LEFT THIGH: Primary | ICD-10-CM

## 2024-06-20 DIAGNOSIS — G57.02 PIRIFORMIS SYNDROME OF LEFT SIDE: ICD-10-CM

## 2024-06-20 PROCEDURE — 97112 NEUROMUSCULAR REEDUCATION: CPT

## 2024-06-20 PROCEDURE — 97110 THERAPEUTIC EXERCISES: CPT

## 2024-06-20 NOTE — PROGRESS NOTES
Daily Note     Today's date: 2024  Patient name: Jaime Person  : 1990  MRN: [id number]  Referring provider: Ricardo Soto DO  Dx:   Encounter Diagnosis     ICD-10-CM    1. Hamstring tendinitis of left thigh  M76.892       2. Piriformis syndrome of left side  G57.02           Start Time: 1021  Stop Time: 1101  Total time in clinic (min): 40 minutes    Subjective: Pt states that his hip and hamstring have felt good since his eval, he just had a baby so he wasn't able to get to his HEP consistently but did do them yesterday. Pt felt general workout soreness and no pain in his hamstrings with his movements. No new complaints.       Objective: See treatment diary below      Assessment: Tolerated treatment well. Patient demonstrated fatigue post treatment, exhibited good technique with therapeutic exercises, and would benefit from continued PT. Progressed treatments today for more hamstring and glute strengthening, given minor VC for proper hip positioning. No pain noted throughout the session.       Plan: Continue per plan of care.  Progress treatment as tolerated.       Precautions: none        Insurance:  AMA/CMS Eval/ Re-eval Auth #/ Referral # Total units or visits Start date  Expiration date KX? Visit limitation?  PT only or  PT+OT? Co-Insurance   AMA 24 Not required     BOMN  20% after ded                  POC Start Date POC Expiration Date Signed POC?   2024 pending      Date               Visits/Units:  Used               Authed: not required  Remaining                      Manuals                                  Neuro Re-Ed       Bridges   Up with 2, down with L 2x10    W/GPB HS curl 1x15 Up with 2, down with L 5sec lower 10x   Posterior pelvic tilts    In standing 5sec hold 5x   Single leg RDL   2x10 BLE    Lunge sliders   2 ways 10x ea BLE                         Ther Ex       HEP    Initiated    Education    Pelvic positioning at work, loading principles    Hip  extension   Prone 2x10 Prone 10x 5sec lower   Piriformis stretch   2x30s B 1 x 30s   Cross over glute stretch   2x30s B 1 x 30s   Donkey kicks   10x BLE                                Ther Activity                     Gait Training                     Modalities

## 2024-06-24 ENCOUNTER — OFFICE VISIT (OUTPATIENT)
Dept: PHYSICAL THERAPY | Facility: CLINIC | Age: 34
End: 2024-06-24
Payer: COMMERCIAL

## 2024-06-24 DIAGNOSIS — M76.892 HAMSTRING TENDINITIS OF LEFT THIGH: Primary | ICD-10-CM

## 2024-06-24 DIAGNOSIS — G57.02 PIRIFORMIS SYNDROME OF LEFT SIDE: ICD-10-CM

## 2024-06-24 PROCEDURE — 97110 THERAPEUTIC EXERCISES: CPT

## 2024-06-24 PROCEDURE — 97112 NEUROMUSCULAR REEDUCATION: CPT

## 2024-06-24 NOTE — PROGRESS NOTES
Daily Note     Today's date: 2024  Patient name: Jaime Person  : 1990  MRN: [id number]  Referring provider: Ricardo Soto DO  Dx:   Encounter Diagnosis     ICD-10-CM    1. Hamstring tendinitis of left thigh  M76.892       2. Piriformis syndrome of left side  G57.02           Start Time: 1532  Stop Time: 1615  Total time in clinic (min): 43 minutes    Subjective: Pt reports that his LLE was more sore than he expected after his last treatment session, however he was feeling good. Pt wasn't able to get to the full HEP over the weekend but was still stretching and doing a few of his exercises. Pt states some tenderness in his L hamstrings prior to the start of his treatment session today.       Objective: See treatment diary below      Assessment: Tolerated treatment well. Patient demonstrated fatigue post treatment, exhibited good technique with therapeutic exercises, and would benefit from continued PT. Continued to work on progressions of his strengthening program with minimal VC needed for hip positioning. Pt demonstrated good carryover from his previous session.       Plan: Continue per plan of care.  Progress treatment as tolerated.       Precautions: none        Insurance:  AMA/CMS Eval/ Re-eval Auth #/ Referral # Total units or visits Start date  Expiration date KX? Visit limitation?  PT only or  PT+OT? Co-Insurance   AMA 24 Not required     BOMN  20% after ded                  POC Start Date POC Expiration Date Signed POC?   2024 pending      Date               Visits/Units:  Used               Authed: not required  Remaining                      Manuals                                 Neuro Re-Ed       Bridges  Single leg bridges 1x15 BLE    W/GPB HS curl 1x15 2 ways Up with 2, down with L 2x10    W/GPB HS curl 1x15 Up with 2, down with L 5sec lower 10x   Posterior pelvic tilts    In standing 5sec hold 5x   Single leg RDL  2x10 BLE 2x10 BLE    Lunge sliders   2  ways 10x ea BLE    SLS  3 ways on airex 30x w/trampoline toss                   Ther Ex       HEP    Initiated    Education    Pelvic positioning at work, loading principles    Hip extension   Prone 2x10 Prone 10x 5sec lower   Piriformis stretch  2x30s B 2x30s B 1 x 30s   Cross over glute stretch  2x30s B 2x30s B 1 x 30s   Donkey kicks   10x BLE    Supine HS str  2x30s B w/SOS     [place] HS curl  1x10 no ball  1x10 w/ball                   Ther Activity                     Gait Training                     Modalities

## 2024-06-27 ENCOUNTER — OFFICE VISIT (OUTPATIENT)
Dept: PHYSICAL THERAPY | Facility: CLINIC | Age: 34
End: 2024-06-27
Payer: COMMERCIAL

## 2024-06-27 DIAGNOSIS — G57.02 PIRIFORMIS SYNDROME OF LEFT SIDE: Primary | ICD-10-CM

## 2024-06-27 DIAGNOSIS — M76.892 HAMSTRING TENDINITIS OF LEFT THIGH: ICD-10-CM

## 2024-06-27 PROCEDURE — 97110 THERAPEUTIC EXERCISES: CPT | Performed by: PHYSICAL THERAPIST

## 2024-06-27 PROCEDURE — 97112 NEUROMUSCULAR REEDUCATION: CPT | Performed by: PHYSICAL THERAPIST

## 2024-06-27 NOTE — PROGRESS NOTES
Daily Note     Today's date: 2024  Patient name: Jaime Person  : 1990  MRN: [id number]  Referring provider: Ricardo Soto DO  Dx:   Encounter Diagnosis     ICD-10-CM    1. Piriformis syndrome of left side  G57.02       2. Hamstring tendinitis of left thigh  M76.892                      Subjective: Patient reports he feels a little more soreness than he had over the past few days, though he performed the exercises yesterday.      Objective: See treatment diary below      Assessment: Tolerated treatment well. Patient demonstrates significant tightness of the hamstring globally. Introduced multi-directional hamstring stretches with a proximal bias.  Challenged by intensity of glute activation.  Patient demonstrated fatigue post treatment, exhibited good technique with therapeutic exercises, and would benefit from continued PT      Plan: Continue per plan of care.  Progress treatment as tolerated.  Progress challenge as appropriate with irritability.     Precautions: none        Insurance:  AMA/CMS Eval/ Re-eval Auth #/ Referral # Total units or visits Start date  Expiration date KX? Visit limitation?  PT only or  PT+OT? Co-Insurance   AMA 24 Not required     BOMN  20% after ded                  POC Start Date POC Expiration Date Signed POC?   2024 pending      Date               Visits/Units:  Used               Authed: not required  Remaining                  Access Code [place]: [email or web address]     Manuals    Hip Quadrant Gr 3                           Neuro Re-Ed       Bridges  Single leg bridges 1x15 BLE    W/GPB HS curl 1x15 2 ways Up with 2, down with L 2x10    W/GPB HS curl 1x15 Up with 2, down with L 5sec lower 10x   Posterior pelvic tilts    In standing 5sec hold 5x   Side Plank Clamshell 2x10 ea      Single leg RDL  2x10 BLE 2x10 BLE    Lunge sliders   2 ways 10x ea BLE    SLS  3 ways on airex 30x w/trampoline toss     Hip Burner GTB 2x10 ea      Lateral Walking  "GTB 5x15ft      Ther Ex       HEP    Initiated    Education    Pelvic positioning at work, loading principles    Hip extension   Prone 2x10 Prone 10x 5sec lower   Piriformis stretch 2x30s 2x30s B 2x30s B 1 x 30s   Cross over glute stretch  2x30s B 2x30s B 1 x 30s   Donkey kicks B/L (Frogger) w/ GTB  10x BLE    Supine HS str 3 way w/ SOS HEP, PT assist 10x10\" ea 2x30s B w/SOS     [place] HS curl  1x10 no ball  1x10 w/ball                   Ther Activity                     Gait Training                     Modalities                              "

## 2024-07-01 ENCOUNTER — OFFICE VISIT (OUTPATIENT)
Dept: PHYSICAL THERAPY | Facility: CLINIC | Age: 34
End: 2024-07-01
Payer: COMMERCIAL

## 2024-07-01 DIAGNOSIS — M76.892 HAMSTRING TENDINITIS OF LEFT THIGH: ICD-10-CM

## 2024-07-01 DIAGNOSIS — G57.02 PIRIFORMIS SYNDROME OF LEFT SIDE: Primary | ICD-10-CM

## 2024-07-01 PROCEDURE — 97112 NEUROMUSCULAR REEDUCATION: CPT

## 2024-07-01 NOTE — PROGRESS NOTES
Daily Note     Today's date: 2024  Patient name: Jaime Person  : 1990  MRN: [id number]  Referring provider: Ricardo Soto DO  Dx:   Encounter Diagnosis     ICD-10-CM    1. Piriformis syndrome of left side  G57.02       2. Hamstring tendinitis of left thigh  M76.892           Start Time: 1103  Stop Time: 1145  Total time in clinic (min): 42 minutes    Subjective: Jaime reports his hip has been feeling better overall and he has been consistent with stretching and home exercises.       Objective: See treatment diary below      Assessment: Tolerated treatment well. Jaime continues to do well with progressive loading of proximal hamstring tendon and gluteal musculature. Pt required intermittent cueing to improve lateral glute engagement on left and continues to demonstrate slightly impaired single leg balance on left side. Pt experiences appropriate muscle soreness following exercises and has been experiencing minimal pain during or after sessions.  Patient demonstrated fatigue post treatment, exhibited good technique with therapeutic exercises, and would benefit from continued PT      Plan: Continue per plan of care.  Progress treatment as tolerated.       Precautions: none        Insurance:  AMA/CMS Eval/ Re-eval Auth #/ Referral # Total units or visits Start date  Expiration date KX? Visit limitation?  PT only or  PT+OT? Co-Insurance   AMA 24 Not required     BOMN  20% after ded                  POC Start Date POC Expiration Date Signed POC?   2024 pending      Date               Visits/Units:  Used               Authed: not required  Remaining                  Access Code [employer]: [email or web address]     Manuals    Hip Quadrant  Gr 3                              Neuro Re-Ed        Bridges Single leg bridge 1x15 BLE    + GPB HS curl 1x15 2 ways     Single leg bridges 1x15 BLE    W/GPB HS curl 1x15 2 ways Up with 2, down with L 2x10    W/GPB HS curl 1x15 Up with 2, down with  "L 5sec lower 10x   Posterior pelvic tilts     In standing 5sec hold 5x   Side Plank Clamshell 2x10 ea  2x10 ea      Single leg RDL 2x10 BLE 5lbs  2x10 BLE 2x10 BLE    Lunge sliders 3 way 2x5 ea BLE   2 ways 10x ea BLE    SLS Fwd and lateral on airex 20x ea w/ trampoloine toss  3 ways on airex 30x w/trampoline toss     Hip Burner  GTB 2x10 ea      Lateral Walking  GTB 5x15ft      Standing hip ext [place] 3.1 quick ext, 5s ecc into flexion       Ther Ex        HEP     Initiated    Education     Pelvic positioning at work, loading principles    Hip extension    Prone 2x10 Prone 10x 5sec lower   Piriformis stretch  2x30s 2x30s B 2x30s B 1 x 30s   Cross over glute stretch   2x30s B 2x30s B 1 x 30s   Donkey kicks  B/L (Frogger) w/ GTB  10x BLE    Supine HS str  3 way w/ SOS HEP, PT assist 10x10\" ea 2x30s B w/SOS     [place] HS curl   1x10 no ball  1x10 w/ball                     Ther Activity                        Gait Training                        Modalities                                   "

## 2024-07-05 ENCOUNTER — APPOINTMENT (OUTPATIENT)
Dept: PHYSICAL THERAPY | Facility: CLINIC | Age: 34
End: 2024-07-05
Payer: COMMERCIAL

## 2024-07-08 ENCOUNTER — APPOINTMENT (OUTPATIENT)
Dept: PHYSICAL THERAPY | Facility: CLINIC | Age: 34
End: 2024-07-08
Payer: COMMERCIAL

## 2024-07-11 ENCOUNTER — OFFICE VISIT (OUTPATIENT)
Dept: PHYSICAL THERAPY | Facility: CLINIC | Age: 34
End: 2024-07-11
Payer: COMMERCIAL

## 2024-07-11 DIAGNOSIS — G57.02 PIRIFORMIS SYNDROME OF LEFT SIDE: Primary | ICD-10-CM

## 2024-07-11 DIAGNOSIS — M76.892 HAMSTRING TENDINITIS OF LEFT THIGH: ICD-10-CM

## 2024-07-11 PROCEDURE — 97112 NEUROMUSCULAR REEDUCATION: CPT

## 2024-07-11 PROCEDURE — 97110 THERAPEUTIC EXERCISES: CPT

## 2024-07-11 NOTE — PROGRESS NOTES
Discharge Note     Today's date: 2024  Patient name: Jaime Person  : 1990  MRN: [id number]  Referring provider: Ricardo Soto DO  Dx:   Encounter Diagnosis     ICD-10-CM    1. Piriformis syndrome of left side  G57.02       2. Hamstring tendinitis of left thigh  M76.892           Start Time: 1101  Stop Time: 1146  Total time in clinic (min): 45 minutes    Subjective: Jaime reports that he is experiencing some muscle tightness throughout both legs, however he is feeling greatly improved overall. Pt states he has minimal pain and has been consistent with stretching/HEP. Pt states he was recently away and was able to do activities that would have previously been bothersome for him without issue.       Objective: See treatment diary below  Focused session on promoting independence with HEP and reviewing progressions for exercises.       Assessment: Tolerated treatment well. Patient has made good progress with skilled physical therapy with reduced pain levels overall, improved LE functional strength, and improved tolerance to exercise/activity. Reviewed options for progressions, dynamic warm up, static stretching, and HEP with pt demonstrating good understanding. At this time, Jaime Person is appropriate for discharge to independent HEP.       Plan: Discharge to HEP.     STG 2-4 weeks - met  1. Patient will be independent with HEP  2. Patient will improve left hip extension strength by 1/2 MMT grade and pain <1/10   3. Patient will improve left knee flexion strength by 1/2 MMT grade and pain <1/10   4. Patient will demonstrate improved left hip ROM to WNL    LTG 6-8 weeks - met   1. Patient will be able to return to soccer without limitation   2. Patient will be independent with dynamic warmup and cool down routine to reduce risk of injury with return to sport   3. Patient will be able to lift at least 25lbs with pain <2/10   4. Patient will be able to transfer out of bed with pain <1/10   5. Patient will be  able to sleep 6-8 hours without waking due to pain         Precautions: none     Access Code: [email or web address]  URL: [email or web address]  Date: 07/11/2024  Prepared by: Nellie Balderas    Exercises  - Supine Hamstring Stretch with Strap  - 1 x daily - 7 x weekly - 1 sets - 4 reps - 30s hold  - Standing Hamstring Stretch with Step  - 1 x daily - 7 x weekly - 1 sets - 4 reps - 30sec hold  - Supine Figure 4 Piriformis Stretch  - 1 x daily - 7 x weekly - 1 sets - 4 reps - 30sec  hold  - Standing Hip ER Stretch at Table  - 1 x daily - 7 x weekly - 2 sets - 10 reps  - Supine Gluteus Stretch  - 1 x daily - 7 x weekly - 1 sets - 4 reps - 30sec hold  - Prone Hip Extension  - 1 x daily - 7 x weekly - 2 sets - 10 reps  - Single Leg Bridge  - 1 x daily - 7 x weekly - 2 sets - 10 reps  - Supine Hamstring Curl on Swiss Ball  - 1 x daily - 7 x weekly - 2-3 sets - 10 reps  - Forward T  - 1 x daily - 7 x weekly - 2-3 sets - 10 reps  - 3-Way Lunge on Slider  - 1 x daily - 7 x weekly - 2-3 sets - 10 reps  - Standing Clam with Resistance Loop  - 1 x daily - 7 x weekly - 2 sets - 10 reps  - Single Leg Stance on Foam Pad  - 1 x daily - 7 x weekly - 4 sets - 30 hold  - Standing Posterior Pelvic Tilt  - 1-2 x daily - 7 x weekly - 2 sets - 10 reps - 5sec hold     Insurance:  AMA/CMS Eval/ Re-eval Auth #/ Referral # Total units or visits Start date  Expiration date KX? Visit limitation?  PT only or  PT+OT? Co-Insurance   AMA 6/17/24 Not required     BOMN  20% after ded                  POC Start Date POC Expiration Date Signed POC?   6/17/2024 8/12/2024 yes      Date               Visits/Units:  Used               Authed: not required  Remaining                      Manuals 7/11 7/1 6/27 6/24 6/20 6/17   Hip Quadrant   Gr 3                                 Neuro Re-Ed         Bridges W/ heels on box 10x ea  Single leg bridge 1x15 BLE    + GPB HS curl 1x15 2 ways     Single leg bridges 1x15 BLE    W/GPB HS curl 1x15 2 ways Up with 2,  "down with L 2x10    W/GPB HS curl 1x15 Up with 2, down with L 5sec lower 10x   Posterior pelvic tilts      In standing 5sec hold 5x   Side Plank Clamshell  2x10 ea  2x10 ea      Single leg RDL  2x10 BLE 5lbs  2x10 BLE 2x10 BLE    Lunge sliders  3 way 2x5 ea BLE   2 ways 10x ea BLE    SLS  Fwd and lateral on airex 20x ea w/ trampoloine toss  3 ways on airex 30x w/trampoline toss     Hip Burner   GTB 2x10 ea      Lateral Walking   GTB 5x15ft      Standing hip ext  [place] 3.1 quick ext, 5s ecc into flexion       Ther Ex         HEP Reviewed      Initiated    Education Exercise progressions, dynamic warmup, static stretching      Pelvic positioning at work, loading principles    Hip extension     Prone 2x10 Prone 10x 5sec lower   Piriformis stretch   2x30s 2x30s B 2x30s B 1 x 30s   Cross over glute stretch    2x30s B 2x30s B 1 x 30s   Donkey kicks   B/L (Frogger) w/ GTB  10x BLE    Supine HS str Reviewed 3 way stretch; reviewed in standing   3 way w/ SOS HEP, PT assist 10x10\" ea 2x30s B w/SOS     [place] HS curl    1x10 no ball  1x10 w/ball     HS curls [place] w/ heels on sliders 10x                  Ther Activity                           Gait Training                           Modalities                                        "

## 2024-07-15 ENCOUNTER — APPOINTMENT (OUTPATIENT)
Dept: PHYSICAL THERAPY | Facility: CLINIC | Age: 34
End: 2024-07-15
Payer: COMMERCIAL

## 2024-07-18 ENCOUNTER — APPOINTMENT (OUTPATIENT)
Dept: PHYSICAL THERAPY | Facility: CLINIC | Age: 34
End: 2024-07-18
Payer: COMMERCIAL

## 2024-07-22 ENCOUNTER — APPOINTMENT (OUTPATIENT)
Dept: PHYSICAL THERAPY | Facility: CLINIC | Age: 34
End: 2024-07-22
Payer: COMMERCIAL

## 2024-07-25 ENCOUNTER — APPOINTMENT (OUTPATIENT)
Dept: PHYSICAL THERAPY | Facility: CLINIC | Age: 34
End: 2024-07-25
Payer: COMMERCIAL

## 2025-05-08 DIAGNOSIS — Z13.6 SCREENING FOR HYPERTENSION: ICD-10-CM

## 2025-05-08 DIAGNOSIS — Z00.00 ROUTINE GENERAL MEDICAL EXAMINATION AT A HEALTH CARE FACILITY: Primary | ICD-10-CM

## 2025-05-08 DIAGNOSIS — Z13.29 SCREENING FOR THYROID DISORDER: ICD-10-CM

## 2025-05-08 DIAGNOSIS — Z13.220 SCREENING FOR LIPID DISORDERS: ICD-10-CM

## 2025-06-08 NOTE — PATIENT INSTRUCTIONS
"DISCUSSED HEALTH MAINTENENCE ISSUES  BW WILL BE REVIEWED  ENCOURAGED HEALTHY DIET AND EXERCISE  COLONOSCOPY WILL BE ORDERED  RV FOR ANNUAL HEALTH EXAM IN 1 YEAR  RV SOONER IF THERE ARE ANY CONCERNS      Patient Education     Routine physical for adults   The Basics   Written by the doctors and editors at [facility]   What is a physical? -- A physical is a routine visit, or \"check-up,\" with your doctor. You might also hear it called a \"wellness visit\" or \"preventive visit.\"  During each visit, the doctor will:   Ask about your physical and mental health   Ask about your habits, behaviors, and lifestyle   Do an exam   Give you vaccines if needed   Talk to you about any medicines you take   Give advice about your health   Answer your questions  Getting regular check-ups is an important part of taking care of your health. It can help your doctor find and treat any problems you have. But it's also important for preventing health problems.  A routine physical is different from a \"sick visit.\" A sick visit is when you see a doctor because of a health concern or problem. Since physicals are scheduled ahead of time, you can think about what you want to ask the doctor.  How often should I get a physical? -- It depends on your age and health. In general, for people age 21 years and older:   If you are younger than 50 years, you might be able to get a physical every 3 years.   If you are 50 years or older, your doctor might recommend a physical every year.  If you have an ongoing health condition, like diabetes or high blood pressure, your doctor will probably want to see you more often.  What happens during a physical? -- In general, each visit will include:   Physical exam - The doctor or nurse will check your height, weight, heart rate, and blood pressure. They will also look at your eyes and ears. They will ask about how you are feeling and whether you have any symptoms that bother you.   Medicines - It's a good idea to bring " "a list of all the medicines you take to each doctor visit. Your doctor will talk to you about your medicines and answer any questions. Tell them if you are having any side effects that bother you. You should also tell them if you are having trouble paying for any of your medicines.   Habits and behaviors - This includes:   Your diet   Your exercise habits   Whether you smoke, drink alcohol, or use drugs   Whether you are sexually active   Whether you feel safe at home  Your doctor will talk to you about things you can do to improve your health and lower your risk of health problems. They will also offer help and support. For example, if you want to quit smoking, they can give you advice and might prescribe medicines. If you want to improve your diet or get more physical activity, they can help you with this, too.   Lab tests, if needed - The tests you get will depend on your age and situation. For example, your doctor might want to check your:   Cholesterol   Blood sugar   Iron level   Vaccines - The recommended vaccines will depend on your age, health, and what vaccines you already had. Vaccines are very important because they can prevent certain serious or deadly infections.   Discussion of screening - \"Screening\" means checking for diseases or other health problems before they cause symptoms. Your doctor can recommend screening based on your age, risk, and preferences. This might include tests to check for:   Cancer, such as breast, prostate, cervical, ovarian, colorectal, prostate, lung, or skin cancer   Sexually transmitted infections, such as chlamydia and gonorrhea   Mental health conditions like depression and anxiety  Your doctor will talk to you about the different types of screening tests. They can help you decide which screenings to have. They can also explain what the results might mean.   Answering questions - The physical is a good time to ask the doctor or nurse questions about your health. If " needed, they can refer you to other doctors or specialists, too.  Adults older than 65 years often need other care, too. As you get older, your doctor will talk to you about:   How to prevent falling at home   Hearing or vision tests   Memory testing   How to take your medicines safely   Making sure that you have the help and support you need at home  All topics are updated as new evidence becomes available and our peer review process is complete.  This topic retrieved from [employer] on: May 02, 2024.  Topic 054598 Version 1.0  Release: 32.4.3 - C32.122  © 2024 UpToDate, Inc. and/or its affiliates. All rights reserved.  Consumer Information Use and Disclaimer   Disclaimer: This generalized information is a limited summary of diagnosis, treatment, and/or medication information. It is not meant to be comprehensive and should be used as a tool to help the user understand and/or assess potential diagnostic and treatment options. It does NOT include all information about conditions, treatments, medications, side effects, or risks that may apply to a specific patient. It is not intended to be medical advice or a substitute for the medical advice, diagnosis, or treatment of a health care provider based on the health care provider's examination and assessment of a patient's specific and unique circumstances. Patients must speak with a health care provider for complete information about their health, medical questions, and treatment options, including any risks or benefits regarding use of medications. This information does not endorse any treatments or medications as safe, effective, or approved for treating a specific patient. UpToDate, Inc. and its affiliates disclaim any warranty or liability relating to this information or the use thereof.The use of this information is governed by the Terms of Use, available at https://www.woltersenGeneuwer.com/en/know/clinical-effectiveness-terms. 2024© UpToDate, Inc. and its affiliates  and/or licensors. All rights reserved.  Copyright   © 2024 [employer] and/or its affiliates. All rights reserved.

## 2025-06-09 ENCOUNTER — OFFICE VISIT (OUTPATIENT)
Dept: FAMILY MEDICINE CLINIC | Facility: CLINIC | Age: 35
End: 2025-06-09
Payer: COMMERCIAL

## 2025-06-09 VITALS
WEIGHT: 183.4 LBS | SYSTOLIC BLOOD PRESSURE: 138 MMHG | RESPIRATION RATE: 16 BRPM | TEMPERATURE: 97.9 F | OXYGEN SATURATION: 99 % | DIASTOLIC BLOOD PRESSURE: 80 MMHG | BODY MASS INDEX: 26.26 KG/M2 | HEART RATE: 63 BPM | HEIGHT: 70 IN

## 2025-06-09 DIAGNOSIS — Z00.00 ROUTINE GENERAL MEDICAL EXAMINATION AT A HEALTH CARE FACILITY: Primary | ICD-10-CM

## 2025-06-09 DIAGNOSIS — Z13.220 SCREENING FOR HYPERLIPIDEMIA: ICD-10-CM

## 2025-06-09 DIAGNOSIS — Z13.6 SCREENING FOR HYPERTENSION: ICD-10-CM

## 2025-06-09 DIAGNOSIS — Z13.29 SCREENING FOR HYPOTHYROIDISM: ICD-10-CM

## 2025-06-09 DIAGNOSIS — Z00.00 ANNUAL PHYSICAL EXAM: ICD-10-CM

## 2025-06-09 PROCEDURE — 99395 PREV VISIT EST AGE 18-39: CPT | Performed by: FAMILY MEDICINE

## 2025-06-09 PROCEDURE — 93000 ELECTROCARDIOGRAM COMPLETE: CPT | Performed by: FAMILY MEDICINE

## 2025-06-09 NOTE — PROGRESS NOTES
Adult Annual Physical  Name: Jaime Person      : 1990      MRN: [id number]  Encounter Provider: Steve Ott MD  Encounter Date: 2025   Encounter department: [facility]    :  Assessment & Plan  Routine general medical examination at a health care facility         Screening for hypertension    Orders:  •  POCT ECG    Screening for hyperlipidemia         Screening for hypothyroidism         Annual physical exam             Preventive Screenings:  - Diabetes Screening: orders placed  - Cholesterol Screening: orders placed   - Hepatitis C screening: screening up-to-date   - HIV screening: screening up-to-date   - Colon cancer screening: screening not indicated   - Lung cancer screening: screening not indicated   - Prostate cancer screening: screening not indicated     Immunizations:  - Immunizations due: Tdap      Depression Screening and Follow-up Plan: Patient was screened for depression during today's encounter. They screened negative with a PHQ-2 score of 0.          History of Present Illness     Adult Annual Physical:  Patient presents for annual physical. DISCUSSED HEALTH ISSUES  REVIEWED MEDICAL RECORD  NO CONCERNS AT THIS TIME    .     Diet and Physical Activity:  - Diet/Nutrition: well balanced diet, intermittent fasting, consuming 3-5 servings of fruits/vegetables daily, adequate fiber intake and adequate whole grain intake.  - Exercise: vigorous cardiovascular exercise, strength training exercises, 3-4 times a week on average and 30-60 minutes on average.    Depression Screening:  - PHQ-2 Score: 0    General Health:  - Sleep: sleeps well and 7-8 hours of sleep on average.  - Hearing: normal hearing bilateral ears.  - Vision: no vision problems and previous LASIK surgery.  - Dental: regular dental visits, brushes teeth twice daily and floss regularly.    /GYN Health:  - Follows with GYN: no.   - History of STDs: no     Health:  - History of STDs: no.   -  "Urinary symptoms: none.     Advanced Care Planning:  - Has an advanced directive?: no    - Has a durable medical POA?: no    - ACP document given to patient?: no      Review of Systems   Constitutional:  Negative for chills, fatigue and fever.   HENT:  Negative for congestion, ear discharge, ear pain, mouth sores, postnasal drip, sore throat and trouble swallowing.    Eyes:  Negative for pain, discharge and visual disturbance.   Respiratory:  Negative for cough, shortness of breath and wheezing.    Cardiovascular:  Negative for chest pain, palpitations and leg swelling.   Gastrointestinal:  Negative for abdominal distention, abdominal pain, blood in stool, diarrhea and nausea.   Endocrine: Negative for polydipsia, polyphagia and polyuria.   Genitourinary:  Negative for dysuria, frequency, hematuria and urgency.   Musculoskeletal:  Negative for arthralgias, gait problem and joint swelling.   Skin:  Negative for pallor and rash.   Neurological:  Negative for dizziness, syncope, speech difficulty, weakness, light-headedness, numbness and headaches.   Hematological:  Negative for adenopathy.   Psychiatric/Behavioral:  Negative for behavioral problems, confusion and sleep disturbance. The patient is not nervous/anxious.          Objective   /80   Pulse 63   Temp 97.9 °F (36.6 °C)   Resp 16   Ht 5' 9.5\" (1.765 m)   Wt 83.2 kg (183 lb 6.4 oz)   SpO2 99%   BMI 26.70 kg/m²     Physical Exam  Constitutional:       General: He is not in acute distress.     Appearance: Normal appearance. He is well-developed and normal weight. He is not ill-appearing.   HENT:      Head: Normocephalic and atraumatic.      Right Ear: Tympanic membrane and external ear normal.      Left Ear: Tympanic membrane and external ear normal.      Nose: Nose normal.      Mouth/Throat:      Mouth: Mucous membranes are moist.     Eyes:      General:         Right eye: No discharge.         Left eye: No discharge.      Conjunctiva/sclera: " Conjunctivae normal.      Pupils: Pupils are equal, round, and reactive to light.     Neck:      Thyroid: No thyromegaly.      Vascular: No JVD.     Cardiovascular:      Rate and Rhythm: Normal rate and regular rhythm.      Heart sounds: Normal heart sounds. No murmur heard.  Pulmonary:      Effort: Pulmonary effort is normal.      Breath sounds: Normal breath sounds. No wheezing or rales.   Abdominal:      General: Bowel sounds are normal.      Palpations: Abdomen is soft. There is no mass.      Tenderness: There is no abdominal tenderness. There is no guarding or rebound.   Genitourinary:     Penis: Normal.       Testes: Normal.     Musculoskeletal:         General: No tenderness or deformity. Normal range of motion.      Cervical back: Neck supple.   Lymphadenopathy:      Cervical: No cervical adenopathy.     Skin:     General: Skin is warm and dry.      Findings: No erythema or rash.     Neurological:      General: No focal deficit present.      Mental Status: He is alert and oriented to person, place, and time.      Cranial Nerves: No cranial nerve deficit.      Sensory: No sensory deficit.      Motor: No weakness or abnormal muscle tone.      Coordination: Coordination normal.      Gait: Gait normal.      Deep Tendon Reflexes: Reflexes are normal and symmetric. Reflexes normal.     Psychiatric:         Mood and Affect: Mood normal.         Behavior: Behavior normal.         Thought Content: Thought content normal.         Judgment: Judgment normal.

## 2025-06-20 ENCOUNTER — OFFICE VISIT (OUTPATIENT)
Dept: OBGYN CLINIC | Facility: CLINIC | Age: 35
End: 2025-06-20
Payer: COMMERCIAL

## 2025-06-20 VITALS — WEIGHT: 180 LBS | HEIGHT: 70 IN | BODY MASS INDEX: 25.77 KG/M2

## 2025-06-20 DIAGNOSIS — M54.50 ACUTE RIGHT-SIDED LOW BACK PAIN WITHOUT SCIATICA: Primary | ICD-10-CM

## 2025-06-20 DIAGNOSIS — G57.01 PIRIFORMIS SYNDROME, RIGHT: ICD-10-CM

## 2025-06-20 PROCEDURE — 99214 OFFICE O/P EST MOD 30 MIN: CPT | Performed by: ORTHOPAEDIC SURGERY

## 2025-06-20 RX ORDER — NAPROXEN 500 MG/1
500 TABLET ORAL 2 TIMES DAILY WITH MEALS
Qty: 60 TABLET | Refills: 0 | Status: SHIPPED | OUTPATIENT
Start: 2025-06-20

## 2025-06-20 NOTE — PROGRESS NOTES
"Patient Name: Jaime Person      : 1990       MRN: [id number]   Encounter Provider: Ricardo Soto DO   Encounter Date: 25  Encounter department: [facility]         Assessment & Plan  Acute right-sided low back pain without sciatica    Orders:    Ambulatory referral to Physical Therapy; Future    Piriformis syndrome, right  Jaime has acute right-sided lower back and buttock pain appears to be a piriformis muscle spasm.  He does not have tenderness along the hamstring tendon like he did in the past.  He does not have obvious lower back symptoms on exam as well.  I recommended formal physical therapy, home exercises, anti-inflammatory medications.  If pain persists or worsens we could further image his lower back to evaluate for disc injury.  He agreed with this plan.  Follow-up after therapy if pain persists.  Orders:    Ambulatory referral to Physical Therapy; Future    naproxen (NAPROSYN) 500 mg tablet; Take 1 tablet (500 mg total) by mouth 2 (two) times a day with meals           Follow-up:  No follow-ups on file.     _____________________________________________________  CHIEF COMPLAINT:  Chief Complaint   Patient presents with    Right Hip - Pain     Right glute, hip flexor, lower back   \"[place] type of pain\"       Height 5' 10\" (1.778 m), weight 81.6 kg (180 lb).  Pain Score:   4      SUBJECTIVE:  Jaime Person is a 35 y.o. male who presents to the office for evaluation for right-sided hip and back pain.  He denies any injury or trauma but he had severe onset of pain about a week ago in the right posterior hip region.  He had a similar type of injury a year ago involving the left side resulting in piriformis pain and hamstring tendinitis.  This improved with physical therapy.  This pain feels very similar but a little bit higher in the lower back and buttock region.  He states it began after helping someone move the deck furniture.  He denies any numbness or tingling down the " leg.  He denies any weakness.  He states when he made the appointment 3 days ago the pain was very severe and he had trouble sleeping.  It has lessened since then.    PAST MEDICAL HISTORY:  Past Medical History[1]    PAST SURGICAL HISTORY:  Past Surgical History[2]    FAMILY HISTORY:  Family History[3]    SOCIAL HISTORY:  Social History[4]    MEDICATIONS:  Current Medications[5]    ALLERGIES:  Allergies[6]      _____________________________________________________  Review of Systems     Back Exam     Tenderness   The patient is experiencing no tenderness.     Range of Motion   Extension:  normal   Flexion:  normal     Muscle Strength   Right Quadriceps:  5/5   Left Quadriceps:  5/5   Right Hamstrings:  5/5   Left Hamstrings:  5/5     Tests   Straight leg raise right: negative  Straight leg raise left: negative    Reflexes   Patellar:  normal    Other   Toe walk: normal  Heel walk: normal  Sensation: normal  Gait: normal     Comments:  Slight discomfort with right-sided stork test in the buttock region not low back.    No discomfort with left-sided stork test             Physical Exam  Vitals reviewed.   Constitutional:       Appearance: He is well-developed.   HENT:      Head: Normocephalic and atraumatic.     Eyes:      Conjunctiva/sclera: Conjunctivae normal.      Pupils: Pupils are equal, round, and reactive to light.       Cardiovascular:      Rate and Rhythm: Normal rate.      Pulses: Normal pulses.   Pulmonary:      Effort: Pulmonary effort is normal. No respiratory distress.     Musculoskeletal:      Cervical back: Normal range of motion and neck supple.      Lumbar back: Negative right straight leg raise test and negative left straight leg raise test.      Comments: As noted in HPI     Skin:     General: Skin is warm and dry.     Neurological:      General: No focal deficit present.      Mental Status: He is alert and oriented to person, place, and time.     Psychiatric:         Mood and Affect: Mood normal.          Behavior: Behavior normal.        _____________________________________________________  STUDIES REVIEWED:  I personally reviewed the pertinent images and my independent interpretation is as follows:  None today    PROCEDURES PERFORMED:  Procedures        This document was created using speech voice recognition software.   Grammatical errors, random word insertions, pronoun errors, and incomplete sentences are an occasional consequence of this system due to software limitations, ambient noise, and hardware issues.   Any formal questions or concerns about content, text, or information contained within the body of this dictation should be directly addressed to the provider for clarification.         [1] No past medical history on file.  [2] No past surgical history on file.  [3] No family history on file.  [4]   Social History  Tobacco Use    Smoking status: Never     Passive exposure: Never    Smokeless tobacco: Never   Vaping Use    Vaping status: Never Used   Substance Use Topics    Alcohol use: Yes     Alcohol/week: 2.0 - 4.0 standard drinks of alcohol     Types: 2 - 4 Standard drinks or equivalent per week     Comment: No drinking sunday to thursday    Drug use: Not Currently     Frequency: 1.0 times per week     Types: Marijuana     Comment: occasionally   [5]   Current Outpatient Medications:     b complex vitamins capsule, Take 1 capsule by mouth in the morning., Disp: , Rfl:     cholecalciferol (VITAMIN D3) 1,000 units tablet, Take 1,000 Units by mouth in the morning., Disp: , Rfl:     magnesium gluconate (MAGONATE) 500 mg tablet, Take 500 mg by mouth in the morning and 500 mg in the evening., Disp: , Rfl:     Naproxen Sodium (ALEVE PO), Take by mouth as needed, Disp: , Rfl:     zinc gluconate 50 mg tablet, Take 50 mg by mouth in the morning., Disp: , Rfl:     fluticasone (FLONASE) 50 mcg/act nasal spray, 1 spray into each nostril daily as needed, Disp: , Rfl:     Quercetin 50 MG TABS, Take by mouth  (Patient not taking: Reported on 6/20/2025), Disp: , Rfl:   [6]   Allergies  Allergen Reactions    Pollen Extract Nasal Congestion and Sneezing

## 2025-06-27 LAB
ALBUMIN SERPL-MCNC: 4.7 G/DL (ref 4.1–5.1)
ALP SERPL-CCNC: 54 IU/L (ref 44–121)
ALT SERPL-CCNC: 26 IU/L (ref 0–44)
AST SERPL-CCNC: 28 IU/L (ref 0–40)
BASOPHILS # BLD AUTO: 0 X10E3/UL (ref 0–0.2)
BASOPHILS NFR BLD AUTO: 1 %
BILIRUB SERPL-MCNC: 0.6 MG/DL (ref 0–1.2)
BUN SERPL-MCNC: 14 MG/DL (ref 6–20)
BUN/CREAT SERPL: 14 (ref 9–20)
CALCIUM SERPL-MCNC: 9.6 MG/DL (ref 8.7–10.2)
CHLORIDE SERPL-SCNC: 102 MMOL/L (ref 96–106)
CHOLEST SERPL-MCNC: 237 MG/DL (ref 100–199)
CHOLEST/HDLC SERPL: 3.4 RATIO (ref 0–5)
CO2 SERPL-SCNC: 26 MMOL/L (ref 20–29)
CREAT SERPL-MCNC: 0.99 MG/DL (ref 0.76–1.27)
EGFR: 103 ML/MIN/1.73
EOSINOPHIL # BLD AUTO: 0.2 X10E3/UL (ref 0–0.4)
EOSINOPHIL NFR BLD AUTO: 4 %
ERYTHROCYTE [DISTWIDTH] IN BLOOD BY AUTOMATED COUNT: 12.6 % (ref 11.6–15.4)
GLOBULIN SER-MCNC: 2.4 G/DL (ref 1.5–4.5)
GLUCOSE SERPL-MCNC: 81 MG/DL (ref 70–99)
HCT VFR BLD AUTO: 44.3 % (ref 37.5–51)
HDLC SERPL-MCNC: 69 MG/DL
HGB BLD-MCNC: 14.9 G/DL (ref 13–17.7)
IMM GRANULOCYTES # BLD: 0 X10E3/UL (ref 0–0.1)
IMM GRANULOCYTES NFR BLD: 0 %
LDLC SERPL CALC-MCNC: 160 MG/DL (ref 0–99)
LYMPHOCYTES # BLD AUTO: 1.6 X10E3/UL (ref 0.7–3.1)
LYMPHOCYTES NFR BLD AUTO: 30 %
MCH RBC QN AUTO: 29.6 PG (ref 26.6–33)
MCHC RBC AUTO-ENTMCNC: 33.6 G/DL (ref 31.5–35.7)
MCV RBC AUTO: 88 FL (ref 79–97)
MONOCYTES # BLD AUTO: 0.5 X10E3/UL (ref 0.1–0.9)
MONOCYTES NFR BLD AUTO: 8 %
NEUTROPHILS # BLD AUTO: 3.2 X10E3/UL (ref 1.4–7)
NEUTROPHILS NFR BLD AUTO: 57 %
PLATELET # BLD AUTO: 268 X10E3/UL (ref 150–450)
POTASSIUM SERPL-SCNC: 4.5 MMOL/L (ref 3.5–5.2)
PROT SERPL-MCNC: 7.1 G/DL (ref 6–8.5)
RBC # BLD AUTO: 5.04 X10E6/UL (ref 4.14–5.8)
SL AMB VLDL CHOLESTEROL CALC: 8 MG/DL (ref 5–40)
SODIUM SERPL-SCNC: 140 MMOL/L (ref 134–144)
TRIGL SERPL-MCNC: 50 MG/DL (ref 0–149)
TSH SERPL DL<=0.005 MIU/L-ACNC: 1.47 UIU/ML (ref 0.45–4.5)
WBC # BLD AUTO: 5.6 X10E3/UL (ref 3.4–10.8)